# Patient Record
Sex: MALE | Race: WHITE | NOT HISPANIC OR LATINO | Employment: FULL TIME | URBAN - METROPOLITAN AREA
[De-identification: names, ages, dates, MRNs, and addresses within clinical notes are randomized per-mention and may not be internally consistent; named-entity substitution may affect disease eponyms.]

---

## 2023-12-22 ENCOUNTER — HOSPITAL ENCOUNTER (EMERGENCY)
Facility: CLINIC | Age: 48
Discharge: HOME | End: 2023-12-23
Attending: EMERGENCY MEDICINE | Admitting: EMERGENCY MEDICINE
Payer: COMMERCIAL

## 2023-12-22 DIAGNOSIS — M54.10 RADICULAR LOW BACK PAIN: ICD-10-CM

## 2023-12-22 LAB
ANION GAP SERPL CALCULATED.3IONS-SCNC: 11 MMOL/L (ref 7–15)
BASOPHILS # BLD AUTO: 0.1 10E3/UL (ref 0–0.2)
BASOPHILS NFR BLD AUTO: 0 %
BUN SERPL-MCNC: 18.5 MG/DL (ref 6–20)
CALCIUM SERPL-MCNC: 9.1 MG/DL (ref 8.6–10)
CHLORIDE SERPL-SCNC: 103 MMOL/L (ref 98–107)
CREAT SERPL-MCNC: 0.92 MG/DL (ref 0.67–1.17)
DEPRECATED HCO3 PLAS-SCNC: 25 MMOL/L (ref 22–29)
EGFRCR SERPLBLD CKD-EPI 2021: >90 ML/MIN/1.73M2
EOSINOPHIL # BLD AUTO: 0 10E3/UL (ref 0–0.7)
EOSINOPHIL NFR BLD AUTO: 0 %
ERYTHROCYTE [DISTWIDTH] IN BLOOD BY AUTOMATED COUNT: 11.8 % (ref 10–15)
GLUCOSE SERPL-MCNC: 103 MG/DL (ref 70–99)
HCT VFR BLD AUTO: 45 % (ref 40–53)
HGB BLD-MCNC: 15.3 G/DL (ref 13.3–17.7)
IMM GRANULOCYTES # BLD: 0.1 10E3/UL
IMM GRANULOCYTES NFR BLD: 1 %
LYMPHOCYTES # BLD AUTO: 2.4 10E3/UL (ref 0.8–5.3)
LYMPHOCYTES NFR BLD AUTO: 21 %
MCH RBC QN AUTO: 30.1 PG (ref 26.5–33)
MCHC RBC AUTO-ENTMCNC: 34 G/DL (ref 31.5–36.5)
MCV RBC AUTO: 89 FL (ref 78–100)
MONOCYTES # BLD AUTO: 0.7 10E3/UL (ref 0–1.3)
MONOCYTES NFR BLD AUTO: 6 %
NEUTROPHILS # BLD AUTO: 8.1 10E3/UL (ref 1.6–8.3)
NEUTROPHILS NFR BLD AUTO: 72 %
NRBC # BLD AUTO: 0 10E3/UL
NRBC BLD AUTO-RTO: 0 /100
PLATELET # BLD AUTO: 245 10E3/UL (ref 150–450)
POTASSIUM SERPL-SCNC: 3.8 MMOL/L (ref 3.4–5.3)
RBC # BLD AUTO: 5.08 10E6/UL (ref 4.4–5.9)
SODIUM SERPL-SCNC: 139 MMOL/L (ref 135–145)
WBC # BLD AUTO: 11.3 10E3/UL (ref 4–11)

## 2023-12-22 PROCEDURE — 96375 TX/PRO/DX INJ NEW DRUG ADDON: CPT

## 2023-12-22 PROCEDURE — 250N000011 HC RX IP 250 OP 636: Performed by: EMERGENCY MEDICINE

## 2023-12-22 PROCEDURE — 99285 EMERGENCY DEPT VISIT HI MDM: CPT | Mod: 25

## 2023-12-22 PROCEDURE — 80048 BASIC METABOLIC PNL TOTAL CA: CPT | Performed by: EMERGENCY MEDICINE

## 2023-12-22 PROCEDURE — 36415 COLL VENOUS BLD VENIPUNCTURE: CPT | Performed by: EMERGENCY MEDICINE

## 2023-12-22 PROCEDURE — 96374 THER/PROPH/DIAG INJ IV PUSH: CPT

## 2023-12-22 PROCEDURE — 85025 COMPLETE CBC W/AUTO DIFF WBC: CPT | Performed by: EMERGENCY MEDICINE

## 2023-12-22 RX ORDER — ONDANSETRON 2 MG/ML
4 INJECTION INTRAMUSCULAR; INTRAVENOUS ONCE
Status: COMPLETED | OUTPATIENT
Start: 2023-12-22 | End: 2023-12-22

## 2023-12-22 RX ORDER — DEXAMETHASONE SODIUM PHOSPHATE 10 MG/ML
10 INJECTION, SOLUTION INTRAMUSCULAR; INTRAVENOUS ONCE
Status: COMPLETED | OUTPATIENT
Start: 2023-12-22 | End: 2023-12-22

## 2023-12-22 RX ORDER — HYDROMORPHONE HYDROCHLORIDE 1 MG/ML
0.5 INJECTION, SOLUTION INTRAMUSCULAR; INTRAVENOUS; SUBCUTANEOUS ONCE
Status: COMPLETED | OUTPATIENT
Start: 2023-12-22 | End: 2023-12-22

## 2023-12-22 RX ORDER — KETOROLAC TROMETHAMINE 15 MG/ML
15 INJECTION, SOLUTION INTRAMUSCULAR; INTRAVENOUS ONCE
Status: COMPLETED | OUTPATIENT
Start: 2023-12-22 | End: 2023-12-22

## 2023-12-22 RX ADMIN — DEXAMETHASONE SODIUM PHOSPHATE 10 MG: 10 INJECTION, SOLUTION INTRAMUSCULAR; INTRAVENOUS at 23:04

## 2023-12-22 RX ADMIN — ONDANSETRON 4 MG: 2 INJECTION INTRAMUSCULAR; INTRAVENOUS at 23:03

## 2023-12-22 RX ADMIN — KETOROLAC TROMETHAMINE 15 MG: 15 INJECTION, SOLUTION INTRAMUSCULAR; INTRAVENOUS at 23:03

## 2023-12-22 RX ADMIN — HYDROMORPHONE HYDROCHLORIDE 0.5 MG: 1 INJECTION, SOLUTION INTRAMUSCULAR; INTRAVENOUS; SUBCUTANEOUS at 23:04

## 2023-12-22 ASSESSMENT — ACTIVITIES OF DAILY LIVING (ADL): ADLS_ACUITY_SCORE: 35

## 2023-12-23 ENCOUNTER — APPOINTMENT (OUTPATIENT)
Dept: MRI IMAGING | Facility: CLINIC | Age: 48
End: 2023-12-23
Attending: EMERGENCY MEDICINE
Payer: COMMERCIAL

## 2023-12-23 VITALS
RESPIRATION RATE: 16 BRPM | OXYGEN SATURATION: 97 % | DIASTOLIC BLOOD PRESSURE: 82 MMHG | BODY MASS INDEX: 31.83 KG/M2 | TEMPERATURE: 97.9 F | HEART RATE: 62 BPM | SYSTOLIC BLOOD PRESSURE: 140 MMHG | WEIGHT: 235 LBS | HEIGHT: 72 IN

## 2023-12-23 LAB
HOLD SPECIMEN: NORMAL
HOLD SPECIMEN: NORMAL

## 2023-12-23 PROCEDURE — 72148 MRI LUMBAR SPINE W/O DYE: CPT

## 2023-12-23 RX ORDER — IBUPROFEN 600 MG/1
600 TABLET, FILM COATED ORAL EVERY 6 HOURS PRN
Qty: 20 TABLET | Refills: 0 | Status: SHIPPED | OUTPATIENT
Start: 2023-12-23 | End: 2024-01-22

## 2023-12-23 RX ORDER — ACETAMINOPHEN 500 MG
1000 TABLET ORAL EVERY 6 HOURS PRN
Qty: 30 TABLET | Refills: 0 | Status: SHIPPED | OUTPATIENT
Start: 2023-12-23 | End: 2023-12-30

## 2023-12-23 RX ORDER — CYCLOBENZAPRINE HCL 5 MG
5 TABLET ORAL 3 TIMES DAILY PRN
Qty: 12 TABLET | Refills: 0 | Status: SHIPPED | OUTPATIENT
Start: 2023-12-23

## 2023-12-23 ASSESSMENT — ACTIVITIES OF DAILY LIVING (ADL): ADLS_ACUITY_SCORE: 35

## 2023-12-23 NOTE — PROGRESS NOTES
Contacted regarding 47 yo male presenting to ER with low back and right leg pain.  MRI reveals lumbar HNP    Lumbar MRI  IMPRESSION:  1.  At L3-L4 there is a large right subarticular extruded disc fragment that extends into the adjacent recess, causing significant mass effect/ posterior displacement of right L4 nerve root.  2.  At L5-S1, significant loss of disc height and disc bulge, results in severe bilateral foraminal stenosis. Prominent Modic type II changes at this level.    RECOMMENDATIONS:  Okay to discharge from ER from NS standpoint with NS follow up.  If needs admission for pain management admit to [facility] with NS consult.    KHARI Hagen  [facility] Neurosurgery  [address] [address]    Tel [phone]  Pager [phone]

## 2023-12-23 NOTE — ED TRIAGE NOTES
"Pt BIBA d/t right hip pain that shoots down to right toes. Feels pins and needles for past 2 days. Pt was getting ready for MRI yesterday and twisted body, \"felt a popping sensation\". Pain started last Friday after getting out of car. Pain felt tight until Monday night and gradually worsened. Pain rated 2/10 currently. Pt received 4mg of Zofran. VSS. A&Ox4.      Triage Assessment (Adult)       Row Name 12/22/23 9752          Triage Assessment    Airway WDL WDL        Respiratory WDL    Respiratory WDL WDL        Skin Circulation/Temperature WDL    Skin Circulation/Temperature WDL WDL        Cardiac WDL    Cardiac WDL WDL        Peripheral/Neurovascular WDL    Peripheral Neurovascular WDL WDL        Cognitive/Neuro/Behavioral WDL    Cognitive/Neuro/Behavioral WDL WDL                     "

## 2023-12-23 NOTE — ED PROVIDER NOTES
History     Chief Complaint:  Leg Pain       HPI   Hesham Abreu is a 48 year old male with a past medical history of gout who presents with leg pain. The patient states that he began to develop pain in his right lateral leg one week ago. He states that this came on randomly. Patient states that then on Monday the pain started to radiate into his back so he went to the chiropractor and got adjusted which he states helped the back pain. Chiropractor told him that his hips were uneven. Later on Monday night the leg pain went from the thigh down to his feet and became much more severe to the extent where he could not move around and was crawling. Tuesday morning he went in to [place] and was told he might have a herniated disc and sent him home. On Wednesday he went to [facility] because his symptoms had not gotten better and had xrays taken which were negative. He was initiated on flexeril, oxycodone and prednisone.  Patient states that he was supposed to have a MRI tonight but he was using the bathroom and fell onto the ground. He crawled downstairs and twisted which made the pain get much worse and he could not move. Patient is not having any current back pain but does have slight numbness in his lateral thigh and inner calf. Patient denies any saddle paresthesias or bladder/bowel incontinence. Patient denies any fever or chills. Patient denies any vomiting, chest pain, abdominal pain, dysuria, diarrhea, or dysuria. Patient is an  and states that he wears a tool belt around. Patient denies a history of cancer or IV drug use.      Independent Historian:   None - Patient Only    Review of External Notes:   none      Medications:    Allopurinol    Past Medical History:    Gout  Infertility    Past Surgical History:    No patient has no pertinent surgical history      Physical Exam   Patient Vitals for the past 24 hrs:   BP Temp Temp src Pulse Resp SpO2 Height Weight   12/22/23 2201 -- -- -- -- -- 97 % -- --    12/22/23 2200 (!) 144/96 -- -- 66 -- -- -- --   12/22/23 2111 (!) 176/105 97.9  F (36.6  C) Oral 56 16 98 % 1.829 m (6') 106.6 kg (235 lb)        Physical Exam  Vitals reviewed.  General: The patient appears uncomfortable  Head:  The scalp, face, and head appear normal  Eyes:  The pupils are equal and round    Conjunctivae and sclerae are normal  ENT:    Nose normal. Moist mucous membranes  Neck:  Normal range of motion  CV:  Regular rate and underlying rhythm     Normal S1 and S2    DP/PT pulses 2+ bilaterally  Resp:  Lungs are clear    Non-labored breathing    No rales    No wheezing   GI:  Abdomen is soft, there is no rigidity    No distension    No rebound tenderness     No abdominal tenderness    No guarding  MS:  Back:    The cervical and thoracic spine is non-tender in the midline    The lumbar spine is non-tender in the midline    There is mild lumbar paraspinous muscular pain to palpation    Legs:    There is normal motor strength:     Iliopsoas, quadriceps, hamstrings, tibialis anterior, gastrocnemius, EHL    Sensation intact L2-S1 on bilateral lower extremities    There is normal capillary refill to the toes  Skin:  No rash or acute skin lesions noted.  No shingles noted.  Neuro:  Speech is normal and fluent    Awake and alert    Gait stable      Emergency Department Course     Imaging:  Lumbar spine MRI w/o contrast   Final Result   IMPRESSION:   1.  At L3-L4 there is a large right subarticular extruded disc fragment that extends into the adjacent recess, causing significant mass effect/ posterior displacement of right L4 nerve root.   2.  At L5-S1, significant loss of disc height and disc bulge, results in severe bilateral foraminal stenosis. Prominent Modic type II changes at this level.         Laboratory:  Labs Ordered and Resulted from Time of ED Arrival to Time of ED Departure   BASIC METABOLIC PANEL - Abnormal       Result Value    Sodium 139      Potassium 3.8      Chloride 103      Carbon  Dioxide (CO2) 25      Anion Gap 11      Urea Nitrogen 18.5      Creatinine 0.92      GFR Estimate >90      Calcium 9.1      Glucose 103 (*)    CBC WITH PLATELETS AND DIFFERENTIAL - Abnormal    WBC Count 11.3 (*)     RBC Count 5.08      Hemoglobin 15.3      Hematocrit 45.0      MCV 89      MCH 30.1      MCHC 34.0      RDW 11.8      Platelet Count 245      % Neutrophils 72      % Lymphocytes 21      % Monocytes 6      % Eosinophils 0      % Basophils 0      % Immature Granulocytes 1      NRBCs per 100 WBC 0      Absolute Neutrophils 8.1      Absolute Lymphocytes 2.4      Absolute Monocytes 0.7      Absolute Eosinophils 0.0      Absolute Basophils 0.1      Absolute Immature Granulocytes 0.1      Absolute NRBCs 0.0        Emergency Department Course & Assessments:    Interventions:  Medications   ketorolac (TORADOL) injection 15 mg (15 mg Intravenous $Given 12/22/23 2303)   dexAMETHasone PF (DECADRON) injection 10 mg (10 mg Intravenous $Given 12/22/23 2304)   HYDROmorphone (PF) (DILAUDID) injection 0.5 mg (0.5 mg Intravenous $Given 12/22/23 2304)   ondansetron (ZOFRAN) injection 4 mg (4 mg Intravenous $Given 12/22/23 2303)      Assessments:  2232 Obtained the patients history and performed initial exam  0140 Rechecked the patient and updated him on findings      Consultations/Discussion of Management or Tests:  ED Course as of 12/23/23 0141   Sat Dec 23, 2023   0125 Spoke to Kayley Lara from neurosurgery about the patients findings and next steps       Social Determinants of Health affecting care:   None    Disposition:  The patient was discharged to home.     Impression & Plan      Medical Decision Making:  Patient is a 48-year-old male presenting predominantly with right leg pain/paresthesias.  He is nontoxic on arrival, hemodynamically stable.  He did undergo formal MRI lumbar spine which does show an L3-L4 extruded disc causing posterior displacement of the right L4 nerve root.  I did speak to neurosurgery  regarding patient's presentation and they stated no indication for emergent surgical evaluation at this point in time.  Patient reported symptom improvement during his time in the ED after IV dilaudid, toradol and decadron.  No clinical evidence to suggest cauda equina, discitis or other complication at this time. I did offer observation for continued pain control given that he is already on analgesia at home though he feels comfortable with trial outpatient with plans for neurosurgery follow-up in the outpatient setting.  I provided this referral today.  I also recommended ibuprofen/Tylenol use.  He has 1 more day of prednisone which I recommended he take at this point in time.  I also provided a few more Flexeril on discharge to supplement his oxycodone at home as needed.  Avoid heavy lifting/bending/twisting.  Monitor for increasing paresthesias, focal weakness or should symptoms worsen or change prompted her to present.  All questions addressed.      Diagnosis:    ICD-10-CM    1. Radicular low back pain  M54.10            Discharge Medications:  New Prescriptions    ACETAMINOPHEN (TYLENOL) 500 MG TABLET    Take 2 tablets (1,000 mg) by mouth every 6 hours as needed for pain or mild pain    CYCLOBENZAPRINE (FLEXERIL) 5 MG TABLET    Take 1 tablet (5 mg) by mouth 3 times daily as needed for muscle spasms    IBUPROFEN (ADVIL/MOTRIN) 600 MG TABLET    Take 1 tablet (600 mg) by mouth every 6 hours as needed for moderate pain        Scribe Disclosure:  ZEINA, Doug Davis, am serving as a scribe at 10:06 PM on 12/22/2023 to document services personally performed by Rizwana Lamar DO based on my observations and the provider's statements to me.     12/22/2023   Rizwana Lamar DO McDonald, Lindsey E, DO  12/23/23 0156

## 2024-01-07 ENCOUNTER — HEALTH MAINTENANCE LETTER (OUTPATIENT)
Age: 49
End: 2024-01-07

## 2025-01-25 ENCOUNTER — HEALTH MAINTENANCE LETTER (OUTPATIENT)
Age: 50
End: 2025-01-25